# Patient Record
Sex: MALE | Race: WHITE | NOT HISPANIC OR LATINO | URBAN - METROPOLITAN AREA
[De-identification: names, ages, dates, MRNs, and addresses within clinical notes are randomized per-mention and may not be internally consistent; named-entity substitution may affect disease eponyms.]

---

## 2017-11-07 ENCOUNTER — OUTPATIENT (OUTPATIENT)
Dept: OUTPATIENT SERVICES | Facility: HOSPITAL | Age: 37
LOS: 1 days | Discharge: HOME | End: 2017-11-07

## 2017-11-07 DIAGNOSIS — F13.20 SEDATIVE, HYPNOTIC OR ANXIOLYTIC DEPENDENCE, UNCOMPLICATED: ICD-10-CM

## 2017-11-07 DIAGNOSIS — F41.9 ANXIETY DISORDER, UNSPECIFIED: ICD-10-CM

## 2017-11-07 DIAGNOSIS — F11.20 OPIOID DEPENDENCE, UNCOMPLICATED: ICD-10-CM

## 2017-11-07 DIAGNOSIS — K21.9 GASTRO-ESOPHAGEAL REFLUX DISEASE WITHOUT ESOPHAGITIS: ICD-10-CM

## 2017-11-07 DIAGNOSIS — M54.9 DORSALGIA, UNSPECIFIED: ICD-10-CM

## 2017-11-07 DIAGNOSIS — F93.0 SEPARATION ANXIETY DISORDER OF CHILDHOOD: ICD-10-CM

## 2017-11-14 ENCOUNTER — OUTPATIENT (OUTPATIENT)
Dept: OUTPATIENT SERVICES | Facility: HOSPITAL | Age: 37
LOS: 1 days | Discharge: HOME | End: 2017-11-14

## 2017-11-14 DIAGNOSIS — M54.9 DORSALGIA, UNSPECIFIED: ICD-10-CM

## 2017-11-14 DIAGNOSIS — F41.9 ANXIETY DISORDER, UNSPECIFIED: ICD-10-CM

## 2017-11-14 DIAGNOSIS — F13.20 SEDATIVE, HYPNOTIC OR ANXIOLYTIC DEPENDENCE, UNCOMPLICATED: ICD-10-CM

## 2017-11-14 DIAGNOSIS — F93.0 SEPARATION ANXIETY DISORDER OF CHILDHOOD: ICD-10-CM

## 2017-11-14 DIAGNOSIS — K21.9 GASTRO-ESOPHAGEAL REFLUX DISEASE WITHOUT ESOPHAGITIS: ICD-10-CM

## 2017-11-14 DIAGNOSIS — F11.20 OPIOID DEPENDENCE, UNCOMPLICATED: ICD-10-CM

## 2018-09-20 ENCOUNTER — EMERGENCY (EMERGENCY)
Facility: HOSPITAL | Age: 38
LOS: 0 days | Discharge: HOME | End: 2018-09-20

## 2018-09-20 VITALS
HEART RATE: 60 BPM | TEMPERATURE: 98 F | DIASTOLIC BLOOD PRESSURE: 87 MMHG | SYSTOLIC BLOOD PRESSURE: 154 MMHG | OXYGEN SATURATION: 100 % | RESPIRATION RATE: 18 BRPM | WEIGHT: 164.91 LBS

## 2018-09-20 DIAGNOSIS — K02.9 DENTAL CARIES, UNSPECIFIED: ICD-10-CM

## 2018-09-20 DIAGNOSIS — Z88.0 ALLERGY STATUS TO PENICILLIN: ICD-10-CM

## 2018-09-20 DIAGNOSIS — K08.89 OTHER SPECIFIED DISORDERS OF TEETH AND SUPPORTING STRUCTURES: ICD-10-CM

## 2018-09-20 DIAGNOSIS — Z79.891 LONG TERM (CURRENT) USE OF OPIATE ANALGESIC: ICD-10-CM

## 2018-09-20 RX ORDER — KETOROLAC TROMETHAMINE 30 MG/ML
30 SYRINGE (ML) INJECTION ONCE
Qty: 0 | Refills: 0 | Status: DISCONTINUED | OUTPATIENT
Start: 2018-09-20 | End: 2018-09-20

## 2018-09-20 NOTE — ED PROVIDER NOTE - ENMT, MLM
Airway patent, Nasal mucosa clear. Mouth with normal mucosa. Throat has no vesicles, no oropharyngeal exudates and uvula is midline.  + poor dentition ; + tenderness along tooth # 18 with evidence of dental decay without surrounding gum fluctuance

## 2018-09-20 NOTE — CONSULT NOTE ADULT - SUBJECTIVE AND OBJECTIVE BOX
Patient states that he has several teeth that have been hurting him and he understands that he requires extensive dental care. Patient states he has found relief in drinking cold water. patient's chief complaint "I have pain up here on the jagged tooth (pointing to the upper left first molar) and down here to the last tooth (pointing to the lower left molar). Patient stated 2 weeks ago he had a swelling in the area but did not go to the dentist or physician out of fear.

## 2018-09-20 NOTE — ED PROVIDER NOTE - OBJECTIVE STATEMENT
37 y/o M, PMHx Substance Dependence - on Methadone, presents to the ED with complaints of dental pain x several months. Patient admits to having been with left lower dental pain ; denies associated facial swelling, fever, chills, nausea, vomiting, neck pain and dyspnea. He admits to having overall poor dentition and states that he does not see a dentist regularly.

## 2018-09-20 NOTE — ED ADULT NURSE NOTE - NSIMPLEMENTINTERV_GEN_ALL_ED
Implemented All Universal Safety Interventions:  [place] to call system. Call bell, personal items and telephone within reach. Instruct patient to call for assistance. Room bathroom lighting operational. Non-slip footwear when patient is off stretcher. Physically safe environment: no spills, clutter or unnecessary equipment. Stretcher in lowest position, wheels locked, appropriate side rails in place.

## 2018-09-20 NOTE — ED ADULT NURSE NOTE - OBJECTIVE STATEMENT
Left sided upper and lower dental pain. no facial swelling no fever reports pain radiating from mouth to ear.

## 2018-09-20 NOTE — ED PROVIDER NOTE - ENMT NEGATIVE STATEMENT, MLM
+ Dental Pain; Ears: no ear pain and no hearing problems.Nose: no nasal congestion and no nasal drainage.Mouth/Throat: no dysphagia, no hoarseness and no throat pain.Neck: no lumps, no pain, no stiffness and no swollen glands.

## 2018-09-20 NOTE — CONSULT NOTE ADULT - ASSESSMENT
Verbally viewed medical history with patient and updated the associated medical forms as patient did not fully complete them on his own.   Asked patient which tooth hurt more and he pointed to the maxillary teeth. Patient states his pain is intermittent and for that reason has not sought dental care    Clinical exam: Patient has residual root tips in all four quadrants and cervical caries on the maxillary anterior teeth. Tooth #14 responded positively to percussion. The gingival area around teeth #14 and #15 responded positively to palpation.     1 periapical radiograph taken. Periapical radiolucency noted on tooth #14.   Treatment to include extraction of teeth #14 and#15.    Discussed risks/benefits/ alternatives to treatment with patient as per OS sheet 7/13/00. Administered 2 carpules of 4 % articaine with 1:100,000 via buccal and palatal injection. Non-surgical extraction tooth #15 and #14 completed. Curetted and irrigated.  1 post operative radiograph taken.    Post operative instructions given verbal and written. Patient reports smoking 1 pack of cigarettes per day. highly advised against not smoking to prevent dry socket which was explained to patient. Amoxicillin 500 mg three times per day for a week and Ibuprofen 600 mg as needed. Disharged in stable condition.